# Patient Record
Sex: MALE | Race: WHITE | NOT HISPANIC OR LATINO | URBAN - METROPOLITAN AREA
[De-identification: names, ages, dates, MRNs, and addresses within clinical notes are randomized per-mention and may not be internally consistent; named-entity substitution may affect disease eponyms.]

---

## 2024-03-06 PROCEDURE — 87081 CULTURE SCREEN ONLY: CPT | Performed by: FAMILY MEDICINE

## 2024-07-02 ENCOUNTER — TRANSCRIBE ORDERS (OUTPATIENT)
Dept: PHYSICAL THERAPY | Facility: CLINIC | Age: 34
End: 2024-07-02

## 2024-07-02 DIAGNOSIS — G80.2 SPASTIC HEMIPLEGIC CEREBRAL PALSY: Primary | ICD-10-CM

## 2024-07-03 ENCOUNTER — TRANSCRIBE ORDERS (OUTPATIENT)
Dept: PHYSICAL THERAPY | Facility: CLINIC | Age: 34
End: 2024-07-03

## 2024-07-03 DIAGNOSIS — Z48.89 OTHER SPECIFIED AFTERCARE FOLLOWING SURGERY: Primary | ICD-10-CM

## 2024-07-03 DIAGNOSIS — G80.2 SPASTIC HEMIPLEGIC CEREBRAL PALSY: ICD-10-CM

## 2024-07-18 ENCOUNTER — TREATMENT (OUTPATIENT)
Dept: PHYSICAL THERAPY | Facility: CLINIC | Age: 34
End: 2024-07-18
Payer: MEDICAID

## 2024-07-18 DIAGNOSIS — M62.89 ABNORMAL INCREASED MUSCLE TONE: ICD-10-CM

## 2024-07-18 DIAGNOSIS — G80.1 SPASTIC CEREBRAL PALSY: Primary | ICD-10-CM

## 2024-07-18 NOTE — PROGRESS NOTES
Occupational Therapy Initial Evaluation and Plan of Care  [facility]  OT: [address]  [place] [address]    Patient: Obinna Wilson   : 1990  Diagnosis/ICD-10 Code:  Spastic cerebral palsy [G80.1]  Referring practitioner: Timi Lua MD  Date of Initial Visit: 2024  Today's Date: 2024  Patient seen for 1 sessions           Subjective Questionnaire: QuickDASH: 44/55      Subjective Evaluation    History of Present Illness  Date of surgery: 6/10/2024  Mechanism of injury: Pt is a RHD 34 y/o male who presents to therapy s/p tendon lengthening in the R UE. Pt's mom is here to provide information. Pt's mom reports on 6/10 pt underwent tendon lengthening due to contracture from spastic cerebral palsy. Pt's mom reports dressing and other care was becoming increasingly difficult due to spasticity. Pt is disabled but enjoys playing video games and playing on his tablet.  PMHx: spastic cerebral palsy, baclofen pump placement.      Pain  Current pain ratin  At best pain ratin  Pain scale at highest: Pt reports occasionally mild - moderate pain.  Location: dorsum R wrist  Relieving factors: rest  Exacerbated by: pressure.  Progression: improved    Social Support  Lives with: parents    Hand dominance: right    Treatments  Previous treatment: physical therapy  Patient Goals  Patient goals for therapy: increased motion           Objective          Observations     Additional Elbow Observation Details  Well healed scar at anterior R elbow. Pt presents in long arm splint. Pt unable to tolerate strap at wrist. Mild redness at small finger ulnar side of MP joint secondary to pressure from splint. Forearm does not rest flush against splint.  Additional Wrist/Hand Observation Details  Well healed scar at R anterior wrist.    Tenderness     Additional Tenderness Details  Dorsum of R wrist.    Neurological Testing     Additional Neurological Details  Pt reports no numbness/tingling.    Active  Range of Motion     Right Elbow   Flexion: 100 degrees   Extension: -85 degrees     Right Wrist   Wrist flexion: 90 degrees   Wrist extension: Right wrist active extension: -90.     Additional Active Range of Motion Details  Forearm fixed in pronation.  No active RD/UD.  Increased tone severely limits R UE ROM.          Assessment & Plan       Assessment  Impairments: abnormal muscle tone, abnormal or restricted ROM, activity intolerance, lacks appropriate home exercise program and pain with function   Functional limitations: carrying objects, lifting, sleeping, pulling, pushing, uncomfortable because of pain, reaching behind back, reaching overhead and unable to perform repetitive tasks   Assessment details: Pt will benefit from skilled OT secondary to decreased strength/ROM and increased pain that limits pt ability to complete ADLs.  Prognosis: good    Goals  Plan Goals: 1. HEP  LTG 1  12 weeks:  Pt's mom will be independent with HEP.  Status:  New       Plan  Planned modality interventions: cryotherapy and thermotherapy (hydrocollator packs)  Planned therapy interventions: body mechanics training, fine motor coordination training, flexibility, functional ROM exercises, home exercise program, joint mobilization, manual therapy, neuromuscular re-education, postural training, soft tissue mobilization, strengthening, stretching and therapeutic activities  Frequency: 2x week  Duration in weeks: 12  Treatment plan discussed with: patient      Pt is indicated for skilled occupational therapy services.  Timed:         Manual Therapy:    8     mins  37656;         Un-Timed:  Mod Eval     52     Mins  76666    Timed Treatment:   8   mins   Total Treatment:     60   mins    OT SIGNATURE: Yogesh Kelly OT   DATE TREATMENT INITIATED: 7/18/2024  [place] License: [id number]    Initial Certification  Certification Period: 10/15/2024  I certify that the therapy services are furnished while this patient is under my care.  The services  outlined above are required by this patient, and will be reviewed every 90 days.     PHYSICIAN: Timi Ramirez MD      DATE:   MD NPI: [id number]  Please sign and return via fax to   [phone].. Thank you, [facility] Occupational Therapy.

## 2024-07-22 ENCOUNTER — TREATMENT (OUTPATIENT)
Dept: PHYSICAL THERAPY | Facility: CLINIC | Age: 34
End: 2024-07-22
Payer: MEDICAID

## 2024-07-22 DIAGNOSIS — M62.89 ABNORMAL INCREASED MUSCLE TONE: ICD-10-CM

## 2024-07-22 DIAGNOSIS — G80.1 SPASTIC CEREBRAL PALSY: Primary | ICD-10-CM

## 2024-07-22 PROCEDURE — 97140 MANUAL THERAPY 1/> REGIONS: CPT | Performed by: OCCUPATIONAL THERAPIST

## 2024-07-22 NOTE — PROGRESS NOTES
Occupational Therapy Daily Treatment Note  [facility] OT: [address] [place] [address]      Patient: Obinna Wilson   : 1990  Diagnosis/ICD-10 Code:  Spastic cerebral palsy [G80.1]  Referring practitioner: Timi Lua MD  Date of Initial Visit: Type: THERAPY  Noted: 2024  Today's Date: 2024  Patient seen for 2 sessions             Subjective   Obinna Wilson reports: No current pain.    Objective     See Exercise, Manual, and Modality Logs for complete treatment.       Assessment/Plan  Pt tolerated treatment well with no c/o increased pain. OT discussed gentle passive stretching HEP with pt's mom. Teachback successful.  Progress per Plan of Care           Timed:  Manual Therapy:    23     mins  98093;    Timed Treatment:   23   mins   Total Treatment:     23   mins        Yogesh Kelly OT  Occupational Therapist  [place] License: [id number]  NPI: [id number]

## 2024-07-25 ENCOUNTER — TREATMENT (OUTPATIENT)
Dept: PHYSICAL THERAPY | Facility: CLINIC | Age: 34
End: 2024-07-25
Payer: MEDICAID

## 2024-07-25 DIAGNOSIS — M62.89 ABNORMAL INCREASED MUSCLE TONE: ICD-10-CM

## 2024-07-25 DIAGNOSIS — G80.1 SPASTIC CEREBRAL PALSY: Primary | ICD-10-CM

## 2024-07-25 NOTE — PROGRESS NOTES
Occupational Therapy Daily Treatment Note  [address]   [place]  [address]      Patient: Obinna Wilson   : 1990  Referring practitioner: Timi Lua MD  Date of Initial Visit: Type: THERAPY  Noted: 2024  Today's Date: 2024  Patient seen for 3 sessions           Subjective Evaluation    History of Present Illness    Subjective comment: Mom reports that orthosis is not well fitting and causing pressure soreness at the wrist and hand level         Objective           General Comments     Wrist/Hand Comments  What was fabricated, adjusted, or trained:   Wrist and hand portion of the original custom orthosis for the R UE.    Reason for the fabrication, adjustment, or training:  Poor fit and pressure areas at the wrist and hand.    Activities or exercises performed in the orthotic:  Removed for gentle PROM of the R UE by his caretaker. Worn to maintain position following surgery for spasticity.    Description of the client's condition:  Spastic Cerebral Palsy       See Exercise, Manual, and Modality Logs for complete treatment.       Assessment & Plan       Assessment  Assessment details: Clients response:  Mom reports and patient indicated good fit and comfort of orthosis following modification.    Goals  Plan Goals: S and LTG 2:  Better fit of orthosis on the R UE.  Tx:  orthotic modification  Status:  Met          Visit Diagnoses:    ICD-10-CM ICD-9-CM   1. Spastic cerebral palsy  G80.1 343.9   2. Abnormal increased muscle tone  M62.89 728.85       Progress per Plan of Care           Timed:  Manual Therapy:                 0     mins  73054  Therapeutic Exercise:       0    mins  66578     Neuromuscular reeducation       0     mins 31373  Therapeutic Activity               0    mins  15297  Ultrasound:                  0     mins  65230   Orthotic modification                   60     mins 53007    Untimed:  Electrical Stimulation:    0     mins  36111 (  );  Fluidotherapy      0     mins  42038    Timed Treatment:   60   mins   Total Treatment:     60   mins    Paulina Barnard OT, CHT  Occupational Therapist    Electronically signed      [place] license #: [id number]

## 2024-07-31 ENCOUNTER — TREATMENT (OUTPATIENT)
Dept: PHYSICAL THERAPY | Facility: CLINIC | Age: 34
End: 2024-07-31
Payer: MEDICAID

## 2024-07-31 DIAGNOSIS — G80.1 SPASTIC CEREBRAL PALSY: Primary | ICD-10-CM

## 2024-07-31 DIAGNOSIS — M62.89 ABNORMAL INCREASED MUSCLE TONE: ICD-10-CM

## 2024-07-31 NOTE — PROGRESS NOTES
Occupational Therapy Daily Treatment Note  [facility] OT: [address] [place] [address]      Patient: Obinna Wilson   : 1990  Diagnosis/ICD-10 Code:  Spastic cerebral palsy [G80.1]  Referring practitioner: Timi Lua MD  Date of Initial Visit: Type: THERAPY  Noted: 2024  Today's Date: 2024  Patient seen for 4 sessions             Subjective   Obinna Wilson reports: No current pain. Pt's mother reports HEP has been going well.    Objective     See Exercise, Manual, and Modality Logs for complete treatment.       Assessment/Plan  Pt tolerated treatment well. OT educated pt's mom on PROM to the elbow. Pt's mom verbalizes understanding.   Progress per Plan of Care           Timed:  Manual Therapy:    23     mins  67527;        Timed Treatment:   23   mins   Total Treatment:     23   mins        Yogesh Kelly OT  Occupational Therapist  [place] License: [id number]  NPI: [id number]

## 2024-08-12 ENCOUNTER — TREATMENT (OUTPATIENT)
Dept: PHYSICAL THERAPY | Facility: CLINIC | Age: 34
End: 2024-08-12
Payer: MEDICAID

## 2024-08-12 DIAGNOSIS — G80.1 SPASTIC CEREBRAL PALSY: Primary | ICD-10-CM

## 2024-08-12 DIAGNOSIS — M62.89 ABNORMAL INCREASED MUSCLE TONE: ICD-10-CM

## 2024-08-12 PROCEDURE — 97110 THERAPEUTIC EXERCISES: CPT | Performed by: OCCUPATIONAL THERAPIST

## 2024-08-12 PROCEDURE — 97140 MANUAL THERAPY 1/> REGIONS: CPT | Performed by: OCCUPATIONAL THERAPIST

## 2024-08-12 NOTE — PROGRESS NOTES
OT Re-evaluation/Progress Note  [facility]  OT: [address]  [place] [address]    Patient: Obinna Wilson   : 1990  Diagnosis/ICD-10 Code:  Spastic cerebral palsy [G80.1]  Referring practitioner: Timi Lua MD  Date of Initial Visit: Type: THERAPY  Noted: 2024  Today's Date: 2024  Patient seen for 5 sessions      Subjective:   Subjective Questionnaire: QuickDASH: 44/55  Clinical Progress: improved  Home Program Compliance: Yes  Treatment has included: manual therapy    Subjective   Objective          Observations     Additional Elbow Observation Details  Well healed scar at anterior R elbow. Pt presents in long arm splint.   Additional Wrist/Hand Observation Details  Well healed scar at R anterior wrist.    Tenderness     Additional Tenderness Details  Dorsum of R wrist.    Neurological Testing     Additional Neurological Details  Pt reports no numbness/tingling.    Active Range of Motion     Right Elbow   Flexion: 100 degrees   Extension: -80 degrees     Right Wrist   Wrist flexion: 90 degrees   Wrist extension: Right wrist active extension: -90.     Additional Active Range of Motion Details  Forearm fixed in pronation.  No active RD/UD.  Increased tone severely limits R UE ROM.      Assessment & Plan       Assessment  Impairments: abnormal muscle tone, abnormal or restricted ROM, activity intolerance, lacks appropriate home exercise program and pain with function   Functional limitations: carrying objects, lifting, sleeping, pulling, pushing, uncomfortable because of pain, reaching behind back, reaching overhead and unable to perform repetitive tasks   Assessment details: Pt's mother reports no questions regarding HEP at this time. Pt to discharge to home stretching program.  Prognosis: good    Goals  Plan Goals: 1. HEP  LTG 1  12 weeks:  Pt's mom will be independent with HEP.  Status:  Met       Plan  Planned modality interventions: cryotherapy and thermotherapy (hydrocollator  packs)  Planned therapy interventions: body mechanics training, fine motor coordination training, flexibility, functional ROM exercises, home exercise program, joint mobilization, manual therapy, neuromuscular re-education, postural training, soft tissue mobilization, strengthening, stretching and therapeutic activities  Frequency: 2x week  Duration in weeks: 12  Treatment plan discussed with: patient      Progress toward previous goals: All Met      Recommendations: Discharge      OT SIGNATURE: Yogesh Kelly OT   DATE TREATMENT INITIATED: Type: THERAPY  Noted: 7/18/2024  [place] License: [id number]    Signature: __________________________________  Timi Ramirez MD MD NPI: [id number]  Timed:  Manual Therapy:    15     mins  19307;  Therapeutic Exercise:    8     mins  57639;         Timed Treatment:   23   mins   Total Treatment:     23   mins  Please sign and return via fax to [phone]  . Thank you, [facility] Occupational Therapy.

## 2025-08-21 ENCOUNTER — TRANSCRIBE ORDERS (OUTPATIENT)
Dept: ADMINISTRATIVE | Facility: HOSPITAL | Age: 35
End: 2025-08-21
Payer: MEDICAID

## 2025-08-21 DIAGNOSIS — M81.0 OSTEOPOROSIS OF MULTIPLE SITES: Primary | ICD-10-CM
